# Patient Record
Sex: FEMALE | Race: WHITE | NOT HISPANIC OR LATINO
[De-identification: names, ages, dates, MRNs, and addresses within clinical notes are randomized per-mention and may not be internally consistent; named-entity substitution may affect disease eponyms.]

---

## 2018-11-28 ENCOUNTER — RX ONLY (OUTPATIENT)
Age: 63
Setting detail: RX ONLY
End: 2018-11-28

## 2018-11-28 ENCOUNTER — APPOINTMENT (RX ONLY)
Dept: RURAL CLINIC 4 | Facility: CLINIC | Age: 63
Setting detail: DERMATOLOGY
End: 2018-11-28

## 2018-11-28 DIAGNOSIS — D485 NEOPLASM OF UNCERTAIN BEHAVIOR OF SKIN: ICD-10-CM

## 2018-11-28 DIAGNOSIS — L08.9 LOCAL INFECTION OF THE SKIN AND SUBCUTANEOUS TISSUE, UNSPECIFIED: ICD-10-CM

## 2018-11-28 DIAGNOSIS — D18.0 HEMANGIOMA: ICD-10-CM

## 2018-11-28 DIAGNOSIS — L82.1 OTHER SEBORRHEIC KERATOSIS: ICD-10-CM

## 2018-11-28 DIAGNOSIS — L57.8 OTHER SKIN CHANGES DUE TO CHRONIC EXPOSURE TO NONIONIZING RADIATION: ICD-10-CM

## 2018-11-28 DIAGNOSIS — L29.89 OTHER PRURITUS: ICD-10-CM

## 2018-11-28 DIAGNOSIS — D22 MELANOCYTIC NEVI: ICD-10-CM

## 2018-11-28 PROBLEM — D48.5 NEOPLASM OF UNCERTAIN BEHAVIOR OF SKIN: Status: ACTIVE | Noted: 2018-11-28

## 2018-11-28 PROBLEM — D22.5 MELANOCYTIC NEVI OF TRUNK: Status: ACTIVE | Noted: 2018-11-28

## 2018-11-28 PROBLEM — L29.8 OTHER PRURITUS: Status: ACTIVE | Noted: 2018-11-28

## 2018-11-28 PROBLEM — D18.01 HEMANGIOMA OF SKIN AND SUBCUTANEOUS TISSUE: Status: ACTIVE | Noted: 2018-11-28

## 2018-11-28 PROBLEM — I10 ESSENTIAL (PRIMARY) HYPERTENSION: Status: ACTIVE | Noted: 2018-11-28

## 2018-11-28 PROCEDURE — ? COUNSELING

## 2018-11-28 PROCEDURE — ? ADDITIONAL NOTES

## 2018-11-28 PROCEDURE — 99203 OFFICE O/P NEW LOW 30 MIN: CPT | Mod: 25

## 2018-11-28 PROCEDURE — ? PRESCRIPTION

## 2018-11-28 PROCEDURE — ? TREATMENT REGIMEN

## 2018-11-28 PROCEDURE — ? ORDER TESTS

## 2018-11-28 PROCEDURE — ? OBSERVATION

## 2018-11-28 PROCEDURE — 11101: CPT

## 2018-11-28 PROCEDURE — 11100: CPT

## 2018-11-28 PROCEDURE — ? BIOPSY BY SHAVE METHOD

## 2018-11-28 RX ORDER — RANITIDINE 150 MG/1
1 TABLET, FILM COATED ORAL BID
Qty: 60 | Refills: 1 | Status: ERX | COMMUNITY
Start: 2018-11-28

## 2018-11-28 RX ORDER — MUPIROCIN 20 MG/G
OINTMENT TOPICAL
Qty: 1 | Refills: 3 | Status: ERX

## 2018-11-28 RX ORDER — MUPIROCIN 20 MG/G
OINTMENT TOPICAL
Qty: 1 | Refills: 3 | Status: ERX | COMMUNITY
Start: 2018-11-28

## 2018-11-28 RX ORDER — TRIAMCINOLONE ACETONIDE 1 MG/G
CREAM TOPICAL BID
Qty: 1 | Refills: 0 | Status: ERX | COMMUNITY
Start: 2018-11-28

## 2018-11-28 RX ADMIN — TRIAMCINOLONE ACETONIDE APPLY THIN LAYER: 1 CREAM TOPICAL at 00:00

## 2018-11-28 RX ADMIN — MUPIROCIN APPLY THIN LAYER: 20 OINTMENT TOPICAL at 00:00

## 2018-11-28 ASSESSMENT — LOCATION DETAILED DESCRIPTION DERM
LOCATION DETAILED: GENITALIA
LOCATION DETAILED: RIGHT ANTERIOR PROXIMAL THIGH
LOCATION DETAILED: EPIGASTRIC SKIN
LOCATION DETAILED: RIGHT SUPERIOR UPPER BACK
LOCATION DETAILED: PERIUMBILICAL SKIN
LOCATION DETAILED: LEFT LATERAL ABDOMEN
LOCATION DETAILED: LEFT SUPERIOR MEDIAL MIDBACK
LOCATION DETAILED: INFERIOR THORACIC SPINE
LOCATION DETAILED: RIGHT INFERIOR MEDIAL FOREHEAD

## 2018-11-28 ASSESSMENT — LOCATION SIMPLE DESCRIPTION DERM
LOCATION SIMPLE: LEFT LOWER BACK
LOCATION SIMPLE: RIGHT UPPER BACK
LOCATION SIMPLE: RIGHT THIGH
LOCATION SIMPLE: UPPER BACK
LOCATION SIMPLE: GENITALIA
LOCATION SIMPLE: ABDOMEN
LOCATION SIMPLE: RIGHT FOREHEAD

## 2018-11-28 ASSESSMENT — LOCATION ZONE DERM
LOCATION ZONE: LEG
LOCATION ZONE: FACE
LOCATION ZONE: TRUNK

## 2018-11-28 NOTE — PROCEDURE: BIOPSY BY SHAVE METHOD
X Size Of Lesion In Cm: 0
Electrodesiccation Text: The wound bed was treated with electrodesiccation after the biopsy was performed.
Dressing: pressure dressing with telfa
Cryotherapy Text: The wound bed was treated with cryotherapy after the biopsy was performed.
Biopsy Type: H and E
Notification Instructions: Patient will be notified of biopsy results. However, patient instructed to call the office if not contacted within 2 weeks.
Lab: [facility]
Lab Facility: [id number]
Curettage Text: The wound bed was treated with curettage after the biopsy was performed.
Size Of Lesion In Cm: 0.6
Type Of Destruction Used: Curettage
Silver Nitrate Text: The wound bed was treated with silver nitrate after the biopsy was performed.
Biopsy Method: Dermablade
Destruction After The Procedure: No
Consent: Written consent was obtained and risks were reviewed including but not limited to scarring, infection, bleeding, scabbing, incomplete removal, nerve damage and allergy to anesthesia.
Was A Bandage Applied: Yes
Hemostasis: Drysol
Billing Type: [employer]
Depth Of Biopsy: dermis
Anesthesia Type: 1% lidocaine with epinephrine and a 1:10 solution of 8.4% sodium bicarbonate
Anesthesia Volume In Cc (Will Not Render If 0): 0.5
Post-Care Instructions: I reviewed with the patient in detail post-care instructions. Patient is to keep the biopsy site dry overnight, and then apply bacitracin twice daily until healed. Patient may apply hydrogen peroxide soaks to remove any crusting.
Detail Level: Detailed
Wound Care: Mupirocin
Electrodesiccation And Curettage Text: The wound bed was treated with electrodesiccation and curettage after the biopsy was performed.
Billing Type: Third-Party Bill
Lab: [id number]
Lab Facility: [id number]

## 2018-11-28 NOTE — PROCEDURE: ORDER TESTS
Expected Date Of Service: 11/28/2018
Performing Laboratory: [id number]
Bill For Surgical Tray: no
Billing Type: [employer]

## 2018-11-28 NOTE — PROCEDURE: TREATMENT REGIMEN
Continue Regimen: Hydroxyzine 25mg QD, Ranitidine 25mg
Detail Level: Simple
Initiate Treatment: Triamcinolone 1% cream to affected areas bid 10/5
Otc Regimen: Aveeno soaks QD, aveeno balm QD

## 2018-12-14 ENCOUNTER — APPOINTMENT (RX ONLY)
Dept: RURAL CLINIC 4 | Facility: CLINIC | Age: 63
Setting detail: DERMATOLOGY
End: 2018-12-14

## 2018-12-14 ENCOUNTER — RX ONLY (OUTPATIENT)
Age: 63
Setting detail: RX ONLY
End: 2018-12-14

## 2018-12-14 DIAGNOSIS — L29.89 OTHER PRURITUS: ICD-10-CM | Status: UNCHANGED

## 2018-12-14 DIAGNOSIS — L08.9 LOCAL INFECTION OF THE SKIN AND SUBCUTANEOUS TISSUE, UNSPECIFIED: ICD-10-CM | Status: RESOLVED

## 2018-12-14 DIAGNOSIS — L30.8 OTHER SPECIFIED DERMATITIS: ICD-10-CM | Status: UNCHANGED

## 2018-12-14 PROBLEM — L29.8 OTHER PRURITUS: Status: ACTIVE | Noted: 2018-12-14

## 2018-12-14 PROCEDURE — ? ADDITIONAL NOTES

## 2018-12-14 PROCEDURE — ? ORDER TESTS

## 2018-12-14 PROCEDURE — 99213 OFFICE O/P EST LOW 20 MIN: CPT

## 2018-12-14 PROCEDURE — ? TREATMENT REGIMEN

## 2018-12-14 PROCEDURE — ? COUNSELING

## 2018-12-14 RX ORDER — HYDROXYZINE HYDROCHLORIDE 25 MG/1
1 TABLET TABLET, FILM COATED ORAL QHS
Qty: 30 | Refills: 0 | Status: ERX

## 2018-12-14 ASSESSMENT — LOCATION SIMPLE DESCRIPTION DERM
LOCATION SIMPLE: LEFT LOWER BACK
LOCATION SIMPLE: LEFT UPPER BACK
LOCATION SIMPLE: ABDOMEN

## 2018-12-14 ASSESSMENT — LOCATION DETAILED DESCRIPTION DERM
LOCATION DETAILED: EPIGASTRIC SKIN
LOCATION DETAILED: LEFT MEDIAL UPPER BACK
LOCATION DETAILED: LEFT INFERIOR MEDIAL MIDBACK

## 2018-12-14 ASSESSMENT — LOCATION ZONE DERM: LOCATION ZONE: TRUNK

## 2018-12-14 NOTE — PROCEDURE: ORDER TESTS
Expected Date Of Service: 12/14/2018
Bill For Surgical Tray: no
Performing Laboratory: -[id number]
Billing Type: [employer]

## 2023-07-05 ENCOUNTER — APPOINTMENT (RX ONLY)
Dept: RURAL CLINIC 4 | Facility: CLINIC | Age: 68
Setting detail: DERMATOLOGY
End: 2023-07-05

## 2023-07-05 DIAGNOSIS — L82.0 INFLAMED SEBORRHEIC KERATOSIS: ICD-10-CM | Status: INADEQUATELY CONTROLLED

## 2023-07-05 DIAGNOSIS — L82.1 OTHER SEBORRHEIC KERATOSIS: ICD-10-CM

## 2023-07-05 DIAGNOSIS — D22 MELANOCYTIC NEVI: ICD-10-CM | Status: STABLE

## 2023-07-05 DIAGNOSIS — L57.8 OTHER SKIN CHANGES DUE TO CHRONIC EXPOSURE TO NONIONIZING RADIATION: ICD-10-CM

## 2023-07-05 DIAGNOSIS — D18.0 HEMANGIOMA: ICD-10-CM | Status: INADEQUATELY CONTROLLED

## 2023-07-05 PROBLEM — D22.39 MELANOCYTIC NEVI OF OTHER PARTS OF FACE: Status: ACTIVE | Noted: 2023-07-05

## 2023-07-05 PROBLEM — D18.01 HEMANGIOMA OF SKIN AND SUBCUTANEOUS TISSUE: Status: ACTIVE | Noted: 2023-07-05

## 2023-07-05 PROCEDURE — ? LIQUID NITROGEN

## 2023-07-05 PROCEDURE — 99203 OFFICE O/P NEW LOW 30 MIN: CPT | Mod: 25

## 2023-07-05 PROCEDURE — ? SUNSCREEN RECOMMENDATIONS

## 2023-07-05 PROCEDURE — ? COUNSELING

## 2023-07-05 PROCEDURE — 17110 DESTRUCTION B9 LES UP TO 14: CPT

## 2023-07-05 ASSESSMENT — LOCATION SIMPLE DESCRIPTION DERM
LOCATION SIMPLE: RIGHT UPPER BACK
LOCATION SIMPLE: CHEST
LOCATION SIMPLE: LEFT FOREARM
LOCATION SIMPLE: LEFT RING FINGER
LOCATION SIMPLE: NOSE

## 2023-07-05 ASSESSMENT — LOCATION ZONE DERM
LOCATION ZONE: ARM
LOCATION ZONE: FINGER
LOCATION ZONE: NOSE
LOCATION ZONE: TRUNK

## 2023-07-05 ASSESSMENT — LOCATION DETAILED DESCRIPTION DERM
LOCATION DETAILED: NASAL ROOT
LOCATION DETAILED: LEFT VENTRAL PROXIMAL FOREARM
LOCATION DETAILED: LEFT PROXIMAL DORSAL RING FINGER
LOCATION DETAILED: RIGHT MEDIAL SUPERIOR CHEST
LOCATION DETAILED: RIGHT SUPERIOR UPPER BACK

## 2023-07-05 NOTE — PROCEDURE: LIQUID NITROGEN
Add 52 Modifier (Optional): no
Consent: The patient's consent was obtained including but not limited to risks of crusting, scabbing, blistering, scarring, darker or lighter pigmentary change, recurrence, incomplete removal and infection.
Show Aperture Variable?: Yes
Medical Necessity Clause: This procedure was medically necessary because the lesions that were treated were: irritated
Medical Necessity Information: It is in your best interest to select a reason for this procedure from the list below. All of these items fulfill various CMS LCD requirements except the new and changing color options.
Detail Level: Detailed
Spray Paint Text: The liquid nitrogen was applied to the skin utilizing a spray paint frosting technique.
Post-Care Instructions: I reviewed with the patient in detail post-care instructions. Patient is to wear sunprotection, and avoid picking at any of the treated lesions. Pt may apply Vaseline to crusted or scabbing areas.

## 2024-07-09 ENCOUNTER — APPOINTMENT (RX ONLY)
Dept: RURAL CLINIC 4 | Facility: CLINIC | Age: 69
Setting detail: DERMATOLOGY
End: 2024-07-09

## 2024-07-09 DIAGNOSIS — L82.1 OTHER SEBORRHEIC KERATOSIS: ICD-10-CM

## 2024-07-09 PROCEDURE — 99212 OFFICE O/P EST SF 10 MIN: CPT

## 2024-07-09 PROCEDURE — ? COUNSELING

## 2024-07-09 ASSESSMENT — LOCATION SIMPLE DESCRIPTION DERM
LOCATION SIMPLE: RIGHT POSTERIOR UPPER ARM
LOCATION SIMPLE: RIGHT CALF
LOCATION SIMPLE: RIGHT FOREARM

## 2024-07-09 ASSESSMENT — LOCATION DETAILED DESCRIPTION DERM
LOCATION DETAILED: RIGHT PROXIMAL CALF
LOCATION DETAILED: RIGHT DISTAL POSTERIOR UPPER ARM
LOCATION DETAILED: RIGHT DISTAL DORSAL FOREARM

## 2024-07-09 ASSESSMENT — LOCATION ZONE DERM
LOCATION ZONE: LEG
LOCATION ZONE: ARM